# Patient Record
(demographics unavailable — no encounter records)

---

## 2024-10-14 NOTE — HISTORY OF PRESENT ILLNESS
[FreeTextEntry8] : 46 y/o M presents with complaint of discomfort around the skin of his penis during after sexual relations.  Pt has small erythematous lesions ,   no blisters, no acute pain,  but irritation and some burning.  He denies discharge from his penis or urinary pain , no hematuria.  He has a girlfriend in [place] who is his only sexual partner.  He does not believe she has any STDs.   No fever/chills/nausea vomiting/abd pain.   Pt uses tight work related clothes .  He cleans his penis with hydrogen peroxide.  He is asking whether he can use lotrimen cream .

## 2024-10-14 NOTE — PHYSICAL EXAM
[Normal] : the outer ears and nose were normal in appearance and the oropharynx was normal [No Respiratory Distress] : no respiratory distress  [Regular Rhythm] : with a regular rhythm [Soft] : abdomen soft [Non Tender] : non-tender [Urethral Meatus] : meatus normal [Scrotum] : the scrotum was normal

## 2024-10-14 NOTE — PLAN
[FreeTextEntry1] : - pt counseled - stop using peroxide to clean penis, use soap and water - use condoms - avoid tight fitting clothing,  keep genital area clean and dry - use cotton underwear - can use lotrimen cream for tinea crura - STD testing and ua - RTC for f/u in 1-2 weeks

## 2024-10-28 NOTE — PHYSICAL EXAM
[No Edema] : there was no peripheral edema [No Rash] : no rash [Normal] : affect was normal and insight and judgment were intact

## 2024-11-01 NOTE — HISTORY OF PRESENT ILLNESS
[de-identified] : Pt is a pleasant 46 y/o M with PMx of allergic dermatitis presenting for CPE.  Pt feels well and would like a screening PSA.   Pt has caries and needs a dental referral.  He denies pain at this time.  He had two prior dental extractions from his left upper jaw. Pt has not had a tetanus vaccine.

## 2024-11-01 NOTE — PLAN
[FreeTextEntry1] : - normal STD result reviewed w/pt.  Pt is sexually active with one female partner, safe sex discussed.  - CMP, CBC, lipid panel, HbA1C, PSA, vit D, stool occult blood - tetanus vaccine to left deltoid - tet vac :   Tdap given IM to L deltoid. [employer],   Adacel 0.5ml .   Lot [id number], Exp Feb 10, 2026.  NDC 14481-446-34 - dental referral - RTC for results and Flu vaccine

## 2024-11-01 NOTE — HEALTH RISK ASSESSMENT
[Good] : ~his/her~ current health as good [Yes] : Yes [Monthly or less (1 pt)] : Monthly or less (1 point) [3 or 4 (1 pt)] : 3 or 4  (1 point) [Never (0 pts)] : Never (0 points) [No] : In the past 12 months have you used drugs other than those required for medical reasons? No [No falls in past year] : Patient reported no falls in the past year [0] : 2) Feeling down, depressed, or hopeless: Not at all (0) [Never] : Never [NO] : No [Health Literacy] : health literacy [# of Members in Household ___] :  household currently consist of [unfilled] member(s) [Employed] : employed [Single] : single [# Of Children ___] : has [unfilled] children [Sexually Active] : sexually active [Feels Safe at Home] : Feels safe at home [Fully functional (bathing, dressing, toileting, transferring, walking, feeding)] : Fully functional (bathing, dressing, toileting, transferring, walking, feeding) [Reports normal functional visual acuity (ie: able to read med bottle)] : Reports normal functional visual acuity [Smoke Detector] : smoke detector [Carbon Monoxide Detector] : carbon monoxide detector [Seat Belt] :  uses seat belt [de-identified] : walking [Change in mental status noted] : No change in mental status noted [High Risk Behavior] : no high risk behavior [Reports changes in hearing] : Reports no changes in hearing [Reports changes in vision] : Reports no changes in vision [TB Exposure] : is not being exposed to tuberculosis

## 2025-02-10 NOTE — REVIEW OF SYSTEMS
[Fever] : no fever [Chills] : no chills [Fatigue] : no fatigue [Chest Pain] : no chest pain [Palpitations] : no palpitations [Claudication] : no  leg claudication [Shortness Of Breath] : no shortness of breath [Wheezing] : no wheezing [Cough] : no cough [Abdominal Pain] : abdominal pain [Nausea] : no nausea [Constipation] : no constipation [Diarrhea] : no diarrhea [Vomiting] : no vomiting [Heartburn] : heartburn [Melena] : no melena [Negative] : Respiratory [FreeTextEntry7] : soft stools

## 2025-02-10 NOTE — HISTORY OF PRESENT ILLNESS
[FreeTextEntry1] : f/u [de-identified] : 48yo M with hx of Triglyceride elevation, Prediabetes-comes in complaining of abdominal pain x2 days as well as left ear pain.  Patient states that ate eggs 2 days ago and stomach is still aching. Denies vomiting/nausea but states that did have soft stools recently. Able to eat normally- today had coffee and bread at 8am. Denies abdominal pain currently. States that eats a lot of spicy foods.  Left ear- mild pain, more itching, no hearing loss, no discharge Patient denies recent travel, fevers/ chills

## 2025-02-10 NOTE — PHYSICAL EXAM
[No Acute Distress] : no acute distress [Well Nourished] : well nourished [Well Developed] : well developed [No Respiratory Distress] : no respiratory distress  [No Accessory Muscle Use] : no accessory muscle use [Clear to Auscultation] : lungs were clear to auscultation bilaterally [Normal Rate] : normal rate  [Regular Rhythm] : with a regular rhythm [Normal S1, S2] : normal S1 and S2 [Soft] : abdomen soft [Non-distended] : non-distended [No Masses] : no abdominal mass palpated [Normal Bowel Sounds] : normal bowel sounds [Normal] : soft, non-tender, non-distended, no masses palpated, no HSM and normal bowel sounds [de-identified] : right ear- clean, no cerum, no infection. left ear- full of hard cerumen  [de-identified] : mild tenderness to both lower left and right quadrants

## 2025-02-10 NOTE — PLAN
[FreeTextEntry1] : left ear- asked patient to buy Debrox otc -will clean ear in 5 days s/p debrox use  PreDM High Triglycerides -will repeat labs today -educated about diet/exercise  Generalized Abdominal pain -possible gerd? vs. gastroenteritis -pt advised to obtain Peptobismal , educated on eating less spicy foods, less citrus, no choclatte, less cafeine   RTC in 5-7 days for f/u and if pain worsens- asked to go straight to the hospital

## 2025-02-19 NOTE — REVIEW OF SYSTEMS
[Fever] : no fever [Chills] : no chills [Fatigue] : no fatigue [Chest Pain] : no chest pain [Palpitations] : no palpitations [Claudication] : no  leg claudication [Shortness Of Breath] : no shortness of breath [Wheezing] : no wheezing [Cough] : no cough [Abdominal Pain] : no abdominal pain [Nausea] : no nausea [Constipation] : no constipation [Diarrhea] : no diarrhea [Vomiting] : no vomiting [Heartburn] : no heartburn [Melena] : no melena [Negative] : Gastrointestinal

## 2025-02-19 NOTE — PHYSICAL EXAM
[No Acute Distress] : no acute distress [Well Nourished] : well nourished [Well Developed] : well developed [No Respiratory Distress] : no respiratory distress  [No Accessory Muscle Use] : no accessory muscle use [Clear to Auscultation] : lungs were clear to auscultation bilaterally [Normal Rate] : normal rate  [Regular Rhythm] : with a regular rhythm [Normal S1, S2] : normal S1 and S2 [Soft] : abdomen soft [Non Tender] : non-tender [Non-distended] : non-distended [No Masses] : no abdominal mass palpated [Normal Bowel Sounds] : normal bowel sounds [Normal] : soft, non-tender, non-distended, no masses palpated, no HSM and normal bowel sounds [Normal Affect] : the affect was normal [Alert and Oriented x3] : oriented to person, place, and time [de-identified] : right ear- clean, no cerum, no infection. left ear-still present with hard cerumen

## 2025-02-19 NOTE — HISTORY OF PRESENT ILLNESS
[FreeTextEntry1] : f/u  [de-identified] : 48yo M with hx of HLD, Pre-DM- comes in for a f/u.  Pt denies anymore abdominal pain Reviewed recent labs with patient Left ear-has been using debrox

## 2025-02-19 NOTE — PLAN
[FreeTextEntry1] : left ear- patient has been using debrox only for 2 days, Tried to remove hard cerumen- however due to discomfort during procedure- pt elected to stop. Advised to continue using more debrox and we will try again at next visit.  PreDM HLD -pt not willing to start meds yet, educated again about healthy diet/exersie -denies family hx of MI or stroke however positive for HTN, HLD  RTC for leaft ear cleaning and in 3 months to repeat labs

## 2025-07-09 NOTE — PHYSICAL EXAM
[No Acute Distress] : no acute distress [Well Nourished] : well nourished [Well Developed] : well developed [No Respiratory Distress] : no respiratory distress  [No Accessory Muscle Use] : no accessory muscle use [Clear to Auscultation] : lungs were clear to auscultation bilaterally [Normal Rate] : normal rate  [Regular Rhythm] : with a regular rhythm [Normal S1, S2] : normal S1 and S2 [Normal] : no rash [de-identified] : right upper back on scapula region- small round nontender mobile mass nontender to touch, no signs of infection

## 2025-07-09 NOTE — PLAN
[FreeTextEntry1] : Lipoma on back -spoke about characteristics of a lipoma and if pt would like to remove it Surgery referral   RTC as needed  Spent 20min on encounter

## 2025-07-09 NOTE — HISTORY OF PRESENT ILLNESS
[FreeTextEntry8] : 46yo with no PMH-presents to clinic stating his friend noticed a "lump" on his back.  Patient has no complaints otherwise, denies any pain, states that is not sure how long the lump has been present on his back.

## 2025-07-10 NOTE — PHYSICAL EXAM
[No Acute Distress] : no acute distress [Well Nourished] : well nourished [Well Developed] : well developed [No Respiratory Distress] : no respiratory distress  [No Accessory Muscle Use] : no accessory muscle use [Clear to Auscultation] : lungs were clear to auscultation bilaterally [Normal Rate] : normal rate  [Regular Rhythm] : with a regular rhythm [Normal S1, S2] : normal S1 and S2 [Normal] : normal rate, regular rhythm, normal S1 and S2 and no murmur heard

## 2025-07-10 NOTE — HISTORY OF PRESENT ILLNESS
Detail Level: Zone [FreeTextEntry8] : 48yo HLD, PreDM here for lab work. Patient has no complaints at today's visit  Detail Level: Detailed

## 2025-07-17 NOTE — HISTORY OF PRESENT ILLNESS
[FreeTextEntry1] : f/u HLD, PreDM [de-identified] : 48yo M with hx of HLD, Pre-DM- comes in for a f/u.  Reviewed all labs with patient Pt has no complaints at today's visit